# Patient Record
Sex: MALE | Race: WHITE | NOT HISPANIC OR LATINO | URBAN - METROPOLITAN AREA
[De-identification: names, ages, dates, MRNs, and addresses within clinical notes are randomized per-mention and may not be internally consistent; named-entity substitution may affect disease eponyms.]

---

## 2024-03-26 ENCOUNTER — HOSPITAL ENCOUNTER (OUTPATIENT)
Dept: RADIOLOGY | Facility: CLINIC | Age: 69
Discharge: HOME | End: 2024-03-26
Payer: OTHER GOVERNMENT

## 2024-03-26 ENCOUNTER — OFFICE VISIT (OUTPATIENT)
Dept: URGENT CARE | Facility: CLINIC | Age: 69
End: 2024-03-26
Payer: OTHER GOVERNMENT

## 2024-03-26 VITALS
WEIGHT: 200 LBS | SYSTOLIC BLOOD PRESSURE: 129 MMHG | BODY MASS INDEX: 28 KG/M2 | HEIGHT: 71 IN | TEMPERATURE: 97.9 F | RESPIRATION RATE: 16 BRPM | HEART RATE: 87 BPM | OXYGEN SATURATION: 95 % | DIASTOLIC BLOOD PRESSURE: 83 MMHG

## 2024-03-26 DIAGNOSIS — S42.401A CLOSED FRACTURE OF RIGHT ELBOW, INITIAL ENCOUNTER: ICD-10-CM

## 2024-03-26 DIAGNOSIS — S59.901A ELBOW INJURY, RIGHT, INITIAL ENCOUNTER: Primary | ICD-10-CM

## 2024-03-26 DIAGNOSIS — S59.901A ELBOW INJURY, RIGHT, INITIAL ENCOUNTER: ICD-10-CM

## 2024-03-26 DIAGNOSIS — L03.113 CELLULITIS OF FOREARM, RIGHT: ICD-10-CM

## 2024-03-26 PROCEDURE — 73080 X-RAY EXAM OF ELBOW: CPT | Mod: RT

## 2024-03-26 PROCEDURE — 1036F TOBACCO NON-USER: CPT | Performed by: PHYSICIAN ASSISTANT

## 2024-03-26 PROCEDURE — 99204 OFFICE O/P NEW MOD 45 MIN: CPT | Performed by: PHYSICIAN ASSISTANT

## 2024-03-26 PROCEDURE — 1125F AMNT PAIN NOTED PAIN PRSNT: CPT | Performed by: PHYSICIAN ASSISTANT

## 2024-03-26 PROCEDURE — 73080 X-RAY EXAM OF ELBOW: CPT | Mod: RIGHT SIDE | Performed by: STUDENT IN AN ORGANIZED HEALTH CARE EDUCATION/TRAINING PROGRAM

## 2024-03-26 PROCEDURE — A4565 SLINGS: HCPCS | Performed by: PHYSICIAN ASSISTANT

## 2024-03-26 RX ORDER — DOXYCYCLINE 100 MG/1
100 CAPSULE ORAL 2 TIMES DAILY
Qty: 14 CAPSULE | Refills: 0 | Status: SHIPPED | OUTPATIENT
Start: 2024-03-26 | End: 2024-04-02

## 2024-03-26 ASSESSMENT — ENCOUNTER SYMPTOMS
CARDIOVASCULAR NEGATIVE: 1
PSYCHIATRIC NEGATIVE: 1
RESPIRATORY NEGATIVE: 1
ARTHRALGIAS: 1
WEAKNESS: 0
HEMATOLOGIC/LYMPHATIC NEGATIVE: 1
ALLERGIC/IMMUNOLOGIC NEGATIVE: 1
GASTROINTESTINAL NEGATIVE: 1
JOINT SWELLING: 1
WOUND: 0
CONSTITUTIONAL NEGATIVE: 1
EYES NEGATIVE: 1
NUMBNESS: 0
ENDOCRINE NEGATIVE: 1
COLOR CHANGE: 1

## 2024-03-26 ASSESSMENT — PAIN SCALES - GENERAL: PAINLEVEL: 2

## 2024-03-26 NOTE — PROGRESS NOTES
"Subjective   Patient ID: Kailash Sevilla is a 69 y.o. male.      History provided by:  Patient   used: No      This is a 69 yr old male here for right elbow pain after fall on a sidewalk 6 days ago. Persistant elbow pain, swelling, decreased ROM and skin erythema of right forearm. No open wound.     Review of Systems   Constitutional: Negative.    HENT: Negative.     Eyes: Negative.    Respiratory: Negative.     Cardiovascular: Negative.    Gastrointestinal: Negative.    Endocrine: Negative.    Genitourinary: Negative.    Musculoskeletal:  Positive for arthralgias and joint swelling.   Skin:  Positive for color change. Negative for wound.   Allergic/Immunologic: Negative.    Neurological:  Negative for weakness and numbness.   Hematological: Negative.    Psychiatric/Behavioral: Negative.     All other systems reviewed and are negative.  /83   Pulse 87   Temp 36.6 °C (97.9 °F)   Resp 16   Ht 1.803 m (5' 11\")   Wt 90.7 kg (200 lb)   SpO2 95%   BMI 27.89 kg/m²     Objective   Physical Exam  Vitals and nursing note reviewed.   Constitutional:       Appearance: Normal appearance.   HENT:      Head: Normocephalic and atraumatic.   Cardiovascular:      Rate and Rhythm: Normal rate and regular rhythm.   Pulmonary:      Effort: Pulmonary effort is normal.      Breath sounds: Normal breath sounds.   Musculoskeletal:         General: Signs of injury present.      Comments: Right elbow-pain with palpation over olecranon, soft tissue swelling, distal n-v intact. Mild skin erythema right forearm, no open or scabbed wound.    Skin:     General: Skin is warm and dry.      Findings: Erythema present. No lesion.   Neurological:      General: No focal deficit present.      Mental Status: He is alert and oriented to person, place, and time.      Sensory: No sensory deficit.      Deep Tendon Reflexes: Reflexes normal.   Psychiatric:         Mood and Affect: Mood normal.         Behavior: Behavior normal. "     Assessment:  Right elbow fx  Cellulitis right forearm    Plan:  Right elbow xray-jt effusion present.   Doxycycline 100 mg bid x 7 days  Arm sling for comfort-dispensed  Ice and elevate right arm 2-3 times a day  Motrin or tylenol as directed  Follow up [facility] per pt request  ER visit anytime 24/7 for acute worsening or changing condiion

## 2024-03-26 NOTE — PATIENT INSTRUCTIONS
Ice and elevate painful area 2-3 times a day  Arm sling till seen by orthopedics at [facility]  Motrin or tylenol as directed for pain  ER visit anytime 24/7 for acute worsening or changing condition